# Patient Record
Sex: FEMALE | Race: WHITE
[De-identification: names, ages, dates, MRNs, and addresses within clinical notes are randomized per-mention and may not be internally consistent; named-entity substitution may affect disease eponyms.]

---

## 2018-07-29 ENCOUNTER — HOSPITAL ENCOUNTER (EMERGENCY)
Dept: HOSPITAL 74 - FER | Age: 23
Discharge: HOME | End: 2018-07-29
Payer: COMMERCIAL

## 2018-07-29 VITALS — DIASTOLIC BLOOD PRESSURE: 74 MMHG | TEMPERATURE: 99.4 F | SYSTOLIC BLOOD PRESSURE: 117 MMHG | HEART RATE: 82 BPM

## 2018-07-29 VITALS — BODY MASS INDEX: 21.4 KG/M2

## 2018-07-29 DIAGNOSIS — T78.49XA: Primary | ICD-10-CM
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
## 2018-07-29 NOTE — PDOC
History of Present Illness





- General


Chief Complaint: Redness To Affected Area


Stated Complaint: RT FOOT SWELLING


Time Seen by Provider: 07/29/18 04:03





- History of Present Illness


Initial Comments: 





07/29/18 04:12





Chief complaint: Red foot





History of present illness: 22 years old no significant past medical history 

presents to the emergency department with one-day history of swelling to right 

foot. Patient has multiple bug bites to her foot they've been itchy and she has 

been scratching him. Tonight she was out with her friends dancing wearing shoes 

when she took off her shoe on her right foot she noticed it to be more swollen. 

No fever no chills no streaking red lines slight redness and swelling to the 

top of the foot which is persistent constant no exacerbating or alleviating 

factors complaining of moderate discomfort.








Past History





- Past Medical History


Allergies/Adverse Reactions: 


 Allergies











Allergy/AdvReac Type Severity Reaction Status Date / Time


 


No Known Allergies Allergy   Verified 01/11/15 11:40











Home Medications: 


Ambulatory Orders





Cephalexin [Keflex] 500 mg PO QID #28 capsule 07/29/18 


Norgestrel-Ethinyl Estradiol [Elinest] 1 each PO DAILY 07/29/18 








COPD: No





- Suicide/Smoking/Psychosocial Hx


Smoking History: Never smoked


Have you smoked in the past 12 months: No


Hx Alcohol Use: No


Substance Use Type: None





**Review of Systems





- Review of Systems


Comments:: 





07/29/18 04:14





ROS:  A complete review of 10 out of 10 review of systems is taken and is 

negative apart from what is previously mentioned below and in the HPI.








*Physical Exam





- Vital Signs


 Last Vital Signs











Temp Pulse Resp BP Pulse Ox


 


 99.4 F   82   16   117/74   100 


 


 07/29/18 03:52  07/29/18 03:52  07/29/18 03:52  07/29/18 03:52  07/29/18 03:52














- Physical Exam


Comments: 





07/29/18 04:14








Vitals: Triage Vital signs reviewed  


General Appearance:  no acute distress, well nourished well developed, 


Head: Atraumatic, 


Extremities: Full range of motion to all extremities, no cyanosis, clubbing, 


Skin:  Warm and dry, right foot with multiple bug bites with localized 

blanching erythema slightly swollen redness extends to mid dorsum of foot does 

not extend past ankle swelling no calf edema


Neuro:  Strength intact to all extremities, Sensation intact to all extremities,

gait normal


Psych:  normal mood, normal affect








Medical Decision Making





- Medical Decision Making





07/29/18 04:17





History examination consistent with localized ALLERGIC reaction secondary to 

bug bite.





Differential diagnosis includes early cellulitis





At this time given one-day history and context of multiple bug bites worsened 

after she took her shoe off after dancing most likely this represents swelling 

and edema from ALLERGIC reaction from bug bite and less likely cellulitis





We'll recommend rest ice Benadryl and close observation. Area of redness marked 

with surgical marker on foot. In case redness worsens or progresses despite 

conservative measures at this point I would become more suspicious for 

cellulitis





This was discussed with patient. A prescription for Keflex was called into the 

patient's pharmacy in case the redness worsens in patient is more concern for 

infection as opposed to an ALLERGIC reaction





Should the patient develop fever chills spreading redness streaking red lines 

she'll return to the emergency department immediately for further management.





Findings, the need for follow-up, strict return instructions discussed with 

patient.








*DC/Admit/Observation/Transfer


Diagnosis at time of Disposition: 


 Allergic reaction to insect bite








- Discharge Dispostion


Condition at time of disposition: Stable


Decision to Admit order: No





- Referrals


Referrals: 


[facility] [place] [Provider Group]





- Patient Instructions


Printed Discharge Instructions:  Insect Bites and Stings


Additional Instructions: 


Rest, keep off foot as much as possible. Ice affected foot 20 minutes on 20 

minutes off. Keep elevated as much as possible. Take 25 mg of Benadryl every 4-

6 hours for the next 2 days. If the redness and swelling does not improve or if 

any redness begins to spread a prescription for Keflex has been called into 

pharmacy take as prescribed. If he develops any fever chills significantly 

worsening redness significantly worsening swelling or any streaking red lines 

moving up to the leg return to emergency department as soon as possible. 

Otherwise follow-up with your doctor or the clinic next week.





- Post Discharge Activity